# Patient Record
Sex: MALE | Race: BLACK OR AFRICAN AMERICAN
[De-identification: names, ages, dates, MRNs, and addresses within clinical notes are randomized per-mention and may not be internally consistent; named-entity substitution may affect disease eponyms.]

---

## 2018-08-24 ENCOUNTER — HOSPITAL ENCOUNTER (EMERGENCY)
Dept: HOSPITAL 97 - ER | Age: 33
Discharge: HOME | End: 2018-08-24
Payer: SELF-PAY

## 2018-08-24 DIAGNOSIS — J02.9: Primary | ICD-10-CM

## 2018-08-24 PROCEDURE — 87804 INFLUENZA ASSAY W/OPTIC: CPT

## 2018-08-24 PROCEDURE — 36415 COLL VENOUS BLD VENIPUNCTURE: CPT

## 2018-08-24 PROCEDURE — 81003 URINALYSIS AUTO W/O SCOPE: CPT

## 2018-08-24 PROCEDURE — 86308 HETEROPHILE ANTIBODY SCREEN: CPT

## 2018-08-24 PROCEDURE — 87081 CULTURE SCREEN ONLY: CPT

## 2018-08-24 PROCEDURE — 99283 EMERGENCY DEPT VISIT LOW MDM: CPT

## 2018-08-24 PROCEDURE — 87070 CULTURE OTHR SPECIMN AEROBIC: CPT

## 2018-08-24 NOTE — ER
Nurse's Notes                                                                                     

 [facility]                                                                

Name: Darek Read III                                                                            

Age: 32 yrs                                                                                       

Sex: Male                                                                                         

: 1985                                                                                   

MRN: [id number]                                                                                   

Arrival Date: 2018                                                                          

Time: 11:40                                                                                       

Account#: [id number]                                                                            

Bed 20                                                                                            

Private MD: None, None                                                                            

Diagnosis: Acute pharyngitis                                                                      

                                                                                                  

Presentation:                                                                                     

                                                                                             

11:46 Presenting complaint: Patient states: fever, sore throat, body aches for 2 days.        la1 

      Transition of care: patient was not received from another setting of care. Onset of         

      symptoms was 2018. Risk Assessment: Do you want to hurt yourself or someone      

      else? Patient reports no desire to harm self or others. Initial Sepsis Screen: Does the     

      patient meet any 2 criteria? No. Patient's initial sepsis screen is negative. Does the      

      patient have a suspected source of infection? No. Patient's initial sepsis screen is        

      negative. Care prior to arrival: None.                                                      

11:46 Method Of Arrival: Ambulatory                                                           la1 

11:46 Acuity: BERENICE 4                                                                           la1 

                                                                                                  

Historical:                                                                                       

- Allergies:                                                                                      

11:48 No Known Allergies;                                                                     la1 

- PMHx:                                                                                           

11:48 None;                                                                                   la1 

                                                                                                  

- Immunization history:: Adult Immunizations up to date.                                          

- Social history:: Smoking status: Patient/guardian denies using tobacco.                         

- Ebola Screening: : No symptoms or risks identified at this time.                                

                                                                                                  

                                                                                                  

Screenin:49 Abuse screen: Denies threats or abuse. Nutritional screening: No deficits noted.        la1 

      Tuberculosis screening: No symptoms or risk factors identified. Fall Risk None              

      identified.                                                                                 

                                                                                                  

Assessment:                                                                                       

11:48 General: Appears uncomfortable, Behavior is calm, cooperative. Pain: Complains of pain  la1 

      in sore throat. Neuro: Level of Consciousness is awake, alert, obeys commands, Oriented     

      to person, place, time, situation. Cardiovascular: Capillary refill < 3 seconds             

      Patient's skin is warm and dry. Respiratory: Airway is patent Breath sounds are clear       

      bilaterally. GI: No signs and/or symptoms were reported involving the gastrointestinal      

      system. EENT: Throat is reddened.                                                           

12:50 Reassessment: Patient appears in no apparent distress at this time. No changes from     la1 

      previously documented assessment. Patient and/or family updated on plan of care and         

      expected duration. Pain level reassessed.                                                   

                                                                                                  

Vital Signs:                                                                                      

11:48  / 75; Pulse 84; Resp 16; Temp 99.1(O); Pulse Ox 100% on R/A; Weight 68.04 kg;    la1 

      Height 5 ft. 9 in. (175.26 cm);                                                             

13:24  / 74; Pulse 81; Resp 19; Temp 98.7(TE); Pulse Ox 98% on R/A;                     la1 

11:48 Body Mass Index 22.15 (68.04 kg, 175.26 cm)                                             la1 

                                                                                                  

ED Course:                                                                                        

11:40 Patient arrived in ED.                                                                  sb2 

11:41 None, None is Private Physician.                                                        sb2 

11:41 Jessica Borja FNP-C is PHCP.                                                        snw 

11:41 Rodrick Thrasher MD is Attending Physician.                                             snw 

11:41 [facility]                                                        kb  

11:46 Ravin Rosario, RN is Primary Nurse.                                                       la1 

11:47 Triage completed.                                                                       la1 

11:48 Arm band placed on left wrist.                                                          la1 

11:49 Call light in reach.                                                                    la1 

11:55 No provider procedures requiring assistance completed. Urine collected: Flu and/or RSV  la1 

      swab sent to lab. Strep swab sent to lab.                                                   

13:24 Patient did not have IV access during this emergency room visit.                        la1 

                                                                                                  

Administered Medications:                                                                         

No medications were administered                                                                  

                                                                                                  

                                                                                                  

Outcome:                                                                                          

13:04 Discharge ordered by MD.                                                                kb  

13:24 Discharged to home ambulatory.                                                          la1 

13:24 Condition: stable                                                                           

13:24 Discharge instructions given to patient, Instructed on discharge instructions, follow       

      up and referral plans. medication usage, Demonstrated understanding of instructions,        

      follow-up care.                                                                             

13:25 Patient left the ED.                                                                    la1 

                                                                                                  

Signatures:                                                                                       

Marlene Nix FNP-C FNP-Ckb                                                   

Jessica Borja FNP-C FNP-Csnw                                                  

Ravin Rosario, RN                         RN   la1                                                  

Mary Macias                               sb2                                                  

                                                                                                  

**************************************************************************************************

## 2018-08-24 NOTE — EDPHYS
Physician Documentation                                                                           

 [facility]                                                                

Name: Darek Read III                                                                            

Age: 32 yrs                                                                                       

Sex: Male                                                                                         

: 1985                                                                                   

MRN: [id number]                                                                                   

Arrival Date: 2018                                                                          

Time: 11:40                                                                                       

Account#: [id number]                                                                            

Bed 20                                                                                            

Private MD: None, None                                                                            

ED Physician Rodrick Thrasher                                                                      

HPI:                                                                                              

                                                                                             

11:57 This 32 yrs old Black Male presents to ER via Ambulatory with complaints of Fever, BODY kb  

      ACHES.                                                                                      

11:57 The patient reports fever, not measured (subjective), with an emergency department      kb  

      temperature of 99.1 degrees Fahrenheit. Onset: The symptoms/episode began/occurred 2        

      day(s) ago. Modifying factors: there are no obvious modifying factors. Associated signs     

      and symptoms: Pertinent positives: runny nose, sore throat, body aches. Severity of         

      symptoms: At their worst the symptoms were moderate in the emergency department the         

      symptoms are unchanged. The patient has not experienced similar symptoms in the past.       

      The patient has not recently seen a physician. Pt reports fever, body aches, sore           

      throat for 2 days. .                                                                        

                                                                                                  

Historical:                                                                                       

- Allergies:                                                                                      

11:48 No Known Allergies;                                                                     la1 

- PMHx:                                                                                           

11:48 None;                                                                                   la1 

                                                                                                  

- Immunization history:: Adult Immunizations up to date.                                          

- Social history:: Smoking status: Patient/guardian denies using tobacco.                         

- Ebola Screening: : No symptoms or risks identified at this time.                                

                                                                                                  

                                                                                                  

ROS:                                                                                              

12:03 Cardiovascular: Negative for chest pain, palpitations, and edema, Respiratory: Negative kb  

      for shortness of breath, cough, wheezing, and pleuritic chest pain, Abdomen/GI:             

      Negative for abdominal pain, nausea, vomiting, diarrhea, and constipation, : Negative     

      for injury, bleeding, discharge, and swelling, MS/Extremity: Negative for injury and        

      deformity, Skin: Negative for injury, rash, and discoloration, Neuro: Negative for          

      headache, weakness, numbness, tingling, and seizure.                                        

12:03 Constitutional: Positive for body aches, fever, malaise, Negative for chills, fatigue,      

      poor PO intake, weight loss.                                                                

12:03 ENT: Positive for sore throat.                                                              

                                                                                                  

Exam:                                                                                             

12:03 Constitutional:  This is a well developed, well nourished patient who is awake, alert,  kb  

      and in no acute distress. Head/Face:  Normocephalic, atraumatic. Chest/axilla:  Normal      

      chest wall appearance and motion.  Nontender with no deformity.  No lesions are             

      appreciated. Cardiovascular:  Regular rate and rhythm with a normal S1 and S2.  No          

      gallops, murmurs, or rubs.  Normal PMI, no JVD.  No pulse deficits. Respiratory:  Lungs     

      have equal breath sounds bilaterally, clear to auscultation and percussion.  No rales,      

      rhonchi or wheezes noted.  No increased work of breathing, no retractions or nasal          

      flaring. Abdomen/GI:  Soft, non-tender, with normal bowel sounds.  No distension or         

      tympany.  No guarding or rebound.  No evidence of tenderness throughout. Skin:  Warm,       

      dry with normal turgor.  Normal color with no rashes, no lesions, and no evidence of        

      cellulitis. MS/ Extremity:  Pulses equal, no cyanosis.  Neurovascular intact.  Full,        

      normal range of motion. Neuro:  Awake and alert, GCS 15, oriented to person, place,         

      time, and situation.  Cranial nerves II-XII grossly intact.  Motor strength 5/5 in all      

      extremities.  Sensory grossly intact.  Cerebellar exam normal.  Normal gait.                

12:03 ENT: External ear(s): are unremarkable, Ear canal(s): are normal, TM's: are normal,         

      Nose: is normal, Mouth: is normal, Posterior pharynx: Airway: normal, Tonsils: with         

      erythema, Uvula: normal, midline, erythema, that is moderate.                               

                                                                                                  

Vital Signs:                                                                                      

11:48  / 75; Pulse 84; Resp 16; Temp 99.1(O); Pulse Ox 100% on R/A; Weight 68.04 kg;    la1 

      Height 5 ft. 9 in. (175.26 cm);                                                             

13:24  / 74; Pulse 81; Resp 19; Temp 98.7(TE); Pulse Ox 98% on R/A;                     la1 

11:48 Body Mass Index 22.15 (68.04 kg, 175.26 cm)                                             la1 

                                                                                                  

MDM:                                                                                              

11:42 Patient medically screened.                                                             kb  

12:03 Data reviewed: vital signs, nurses notes. Data interpreted: Pulse oximetry: on room air kb  

      is 100 %. Interpretation: normal.                                                           

12:22 Counseling: I had a detailed discussion with the patient and/or guardian regarding: the kb  

      historical points, exam findings, and any diagnostic results supporting the                 

      discharge/admit diagnosis, lab results, the need for outpatient follow up, a family         

      practitioner, to return to the emergency department if symptoms worsen or persist or if     

      there are any questions or concerns that arise at home.                                     

                                                                                                  

                                                                                             

11:47 Order name: Strep; Complete Time: 12:17                                                 kb  

                                                                                             

11:50 Order name: Flu; Complete Time: 12:17                                                   kb  

                                                                                             

11:47 Order name: Urine Dipstick-Ancillary (obtain specimen); Complete Time: 11:56            kb  

                                                                                             

12:00 Order name: Urine Dipstick--Ancillary (enter results); Complete Time: 12:44             mb4 

                                                                                             

12:16 Order name: Throat Culture                                                              EDMS

                                                                                             

12:25 Order name: Mono Screen Profile; Complete Time: 13:03                                   kb  

                                                                                                  

Administered Medications:                                                                         

No medications were administered                                                                  

                                                                                                  

                                                                                                  

Disposition:                                                                                      

                                                                                             

08:20 Co-signature as Attending Physician, Rodrick Thrasher MD I agree with the assessment and  wa  

      plan of care.                                                                               

                                                                                                  

Disposition:                                                                                      

18 13:04 Discharged to Home. Impression: Acute pharyngitis.                                 

- Condition is Stable.                                                                            

- Discharge Instructions: Pharyngitis, Easy-to-Read.                                              

                                                                                                  

- Medication Reconciliation Form, Thank You Letter, Antibiotic Education, Prescription            

  Opioid Use, Work release form form.                                                             

- Follow up: Emergency Department; When: As needed; Reason: Worsening of condition.               

  Follow up: Private Physician; When: 2 - 3 days; Reason: Recheck today's complaints,             

  Continuance of care, Re-evaluation by your physician.                                           

                                                                                                  

                                                                                                  

                                                                                                  

Signatures:                                                                                       

Dispatcher [employer]                           [facility]                                                 

Marlene Nix, BECCA RAMIREZP-Ravin Hernandez RN                         RN   la1                                                  

Rodrick Thrasher MD MD   wa                                                   

                                                                                                  

Corrections: (The following items were deleted from the chart)                                    

                                                                                             

13:25 13:04 2018 13:04 Discharged to Home. Impression: Acute pharyngitis. Condition is  la1 

      Stable. Forms are Medication Reconciliation Form, Thank You Letter, Antibiotic              

      Education, Prescription Opioid Use. Follow up: Emergency Department; When: As needed;       

      Reason: Worsening of condition. Follow up: Private Physician; When: 2 - 3 days; Reason:     

      Recheck today's complaints, Continuance of care, Re-evaluation by your physician. kb        

                                                                                                  

**************************************************************************************************